# Patient Record
Sex: MALE | Race: WHITE | NOT HISPANIC OR LATINO | Employment: OTHER | URBAN - METROPOLITAN AREA
[De-identification: names, ages, dates, MRNs, and addresses within clinical notes are randomized per-mention and may not be internally consistent; named-entity substitution may affect disease eponyms.]

---

## 2018-05-15 ENCOUNTER — OFFICE VISIT (OUTPATIENT)
Dept: FAMILY MEDICINE | Facility: CLINIC | Age: 53
End: 2018-05-15
Payer: COMMERCIAL

## 2018-05-15 VITALS
OXYGEN SATURATION: 97 % | BODY MASS INDEX: 33.35 KG/M2 | RESPIRATION RATE: 16 BRPM | TEMPERATURE: 98 F | SYSTOLIC BLOOD PRESSURE: 130 MMHG | HEART RATE: 70 BPM | HEIGHT: 72 IN | DIASTOLIC BLOOD PRESSURE: 82 MMHG | WEIGHT: 246.25 LBS

## 2018-05-15 DIAGNOSIS — Z12.11 COLON CANCER SCREENING: ICD-10-CM

## 2018-05-15 DIAGNOSIS — Z23 NEED FOR DIPHTHERIA-TETANUS-PERTUSSIS (TDAP) VACCINE: ICD-10-CM

## 2018-05-15 DIAGNOSIS — Z13.220 LIPID SCREENING: ICD-10-CM

## 2018-05-15 DIAGNOSIS — M54.12 CERVICAL RADICULOPATHY: Primary | ICD-10-CM

## 2018-05-15 LAB
ALBUMIN SERPL BCP-MCNC: 4.1 G/DL
ALP SERPL-CCNC: 52 U/L
ALT SERPL W/O P-5'-P-CCNC: 26 U/L
ANION GAP SERPL CALC-SCNC: 8 MMOL/L
AST SERPL-CCNC: 16 U/L
BILIRUB SERPL-MCNC: 0.4 MG/DL
BUN SERPL-MCNC: 18 MG/DL
CALCIUM SERPL-MCNC: 9.5 MG/DL
CHLORIDE SERPL-SCNC: 107 MMOL/L
CHOLEST SERPL-MCNC: 264 MG/DL
CHOLEST/HDLC SERPL: 4.3 {RATIO}
CO2 SERPL-SCNC: 24 MMOL/L
CREAT SERPL-MCNC: 0.9 MG/DL
EST. GFR  (AFRICAN AMERICAN): >60 ML/MIN/1.73 M^2
EST. GFR  (NON AFRICAN AMERICAN): >60 ML/MIN/1.73 M^2
GLUCOSE SERPL-MCNC: 113 MG/DL
HDLC SERPL-MCNC: 61 MG/DL
HDLC SERPL: 23.1 %
LDLC SERPL CALC-MCNC: 165.2 MG/DL
NONHDLC SERPL-MCNC: 203 MG/DL
POTASSIUM SERPL-SCNC: 3.9 MMOL/L
PROT SERPL-MCNC: 7.5 G/DL
SODIUM SERPL-SCNC: 139 MMOL/L
TRIGL SERPL-MCNC: 189 MG/DL

## 2018-05-15 PROCEDURE — 80053 COMPREHEN METABOLIC PANEL: CPT

## 2018-05-15 PROCEDURE — 90715 TDAP VACCINE 7 YRS/> IM: CPT | Mod: S$GLB,,, | Performed by: INTERNAL MEDICINE

## 2018-05-15 PROCEDURE — 80061 LIPID PANEL: CPT

## 2018-05-15 PROCEDURE — 3008F BODY MASS INDEX DOCD: CPT | Mod: CPTII,S$GLB,, | Performed by: INTERNAL MEDICINE

## 2018-05-15 PROCEDURE — 90471 IMMUNIZATION ADMIN: CPT | Mod: S$GLB,,, | Performed by: INTERNAL MEDICINE

## 2018-05-15 PROCEDURE — 99203 OFFICE O/P NEW LOW 30 MIN: CPT | Mod: 25,S$GLB,, | Performed by: INTERNAL MEDICINE

## 2018-05-15 RX ORDER — TRAMADOL HYDROCHLORIDE 50 MG/1
1 TABLET ORAL EVERY 6 HOURS PRN
COMMUNITY
Start: 2018-05-14 | End: 2018-12-27 | Stop reason: ALTCHOICE

## 2018-05-15 RX ORDER — CYCLOBENZAPRINE HCL 10 MG
1 TABLET ORAL DAILY PRN
COMMUNITY
Start: 2018-05-14 | End: 2018-12-27 | Stop reason: ALTCHOICE

## 2018-05-15 NOTE — PATIENT INSTRUCTIONS
Rest.  Do not work overhead.    Smart temp cold pack    Get a back buddy Jere    Using inversion table

## 2018-05-15 NOTE — PROGRESS NOTES
Subjective:       Patient ID: Thee Christine is a 52 y.o. male.    Chief Complaint: Establish Care; Neck Pain (pinched nerve,had shots); and Hernia    HPI     CHIEF COMPLAINT: Neck Pain(+).  HPI: Has been renovating his house    ONSET/TIMING: Trauma: no. . Onset   2 weeks     ago. . Work related: no .    Similar problems  in past: no .    DURATION:      QUALITY/COURSE:    Worse.       LOCATION:   Left. Radiation: 2 view left hand.     INTENSITY/SEVERITY: Severity is # 4 (10 point scale).      SYMPTOMS/RELATED: .-Possible medication side effects include:     The following symptoms are positive if BOLD, negative otherwise.    CONTEXT/WHEN: --Activity. Coughing. Sneeze.. Working_[place].  . Repetitive_work . Hx of CA. history of IV drug abuse.. Similar_problems.     MODIFIERS/TREATMENTS: . Taking medications.  Steroid shot, tramadol, Flexeril   Chiropractor.  Litigation_pending. c-spine_x-rays. CT. MRI. Surgery.     REVIEW OF SYMPTOMS:  . Arm_Pain_to_below _elbow . .Weight_loss.          Review of Systems    Objective:      Vitals:    05/15/18 1005   BP: 130/82   Pulse: 70   Resp: 16   Temp: 98 °F (36.7 °C)   TempSrc: Oral   SpO2: 97%   Weight: 111.7 kg (246 lb 4.1 oz)   Height: 6' (1.829 m)   PainSc:   4   PainLoc: Neck     Physical Exam   Constitutional: He appears well-developed and well-nourished.   Cardiovascular: Normal rate, regular rhythm and normal heart sounds.    Pulmonary/Chest: Effort normal and breath sounds normal.   Abdominal: Soft. There is no tenderness.   Musculoskeletal:   Tender left upper trapezius, full range of motion of the left shoulder.   Neurological: He is alert.   Psychiatric: He has a normal mood and affect. His behavior is normal. Thought content normal.   Nursing note and vitals reviewed.  The 10-year ASCVD risk score (Samson GREER Jr., et al., 2013) is: 4.7%    Values used to calculate the score:      Age: 52 years      Sex: Male      Is Non- : No      Diabetic: No       Tobacco smoker: No      Systolic Blood Pressure: 130 mmHg      Is BP treated: No      HDL Cholesterol: 61 mg/dL      Total Cholesterol: 264 mg/dL        Assessment:       1. Cervical radiculopathy    2. Colon cancer screening    3. Need for diphtheria-tetanus-pertussis (Tdap) vaccine    4. Lipid screening          Plan:     Cervical radiculopathy  -     Ambulatory consult to Physical Therapy    Colon cancer screening  -     Case request GI: COLONOSCOPY    Need for diphtheria-tetanus-pertussis (Tdap) vaccine  -     (In Office Administered) Tdap Vaccine    Lipid screening  -     Comprehensive metabolic panel; Future; Expected date: 05/15/2018  -     Lipid panel; Future; Expected date: 05/15/2018      Follow-up in about 6 weeks (around 6/26/2018).

## 2018-05-18 ENCOUNTER — TELEPHONE (OUTPATIENT)
Dept: FAMILY MEDICINE | Facility: CLINIC | Age: 53
End: 2018-05-18

## 2018-05-18 NOTE — TELEPHONE ENCOUNTER
Patient called because he has not heard from PT yet and he is in pain.I gave him the number to contact PT.

## 2018-05-18 NOTE — TELEPHONE ENCOUNTER
----- Message from Renee Vasquez sent at 5/18/2018  1:07 PM CDT -----   Type: Needs Medical Advice    Who Called: pt  Symptoms (please be specific):  na  How long has patient had these symptoms:  roberth  Pharmacy name and phone #na  Best Call Back Number:[phone]  Additional Information:  calling  to  Discuss  Physical therapy

## 2018-05-29 ENCOUNTER — TELEPHONE (OUTPATIENT)
Dept: FAMILY MEDICINE | Facility: CLINIC | Age: 53
End: 2018-05-29

## 2018-05-29 DIAGNOSIS — M54.2 NECK PAIN: Primary | ICD-10-CM

## 2018-05-29 NOTE — TELEPHONE ENCOUNTER
----- Message from Brad Dominguez sent at 5/29/2018 11:22 AM CDT -----  Contact: [place] from [facility]  Name of Who is Calling: [place]      What is the request in detail: [place] is calling states she needs an order or prescription for physical therapy. Pt is being seen for neck pains.      Can the clinic reply by [facility]: no      What Number [facility] Back [facility]: [place] fax [phone] or phone [phone]

## 2018-05-31 ENCOUNTER — PATIENT MESSAGE (OUTPATIENT)
Dept: GASTROENTEROLOGY | Facility: CLINIC | Age: 53
End: 2018-05-31

## 2018-06-25 ENCOUNTER — DOCUMENTATION ONLY (OUTPATIENT)
Dept: FAMILY MEDICINE | Facility: CLINIC | Age: 53
End: 2018-06-25

## 2018-12-27 ENCOUNTER — OFFICE VISIT (OUTPATIENT)
Dept: FAMILY MEDICINE | Facility: CLINIC | Age: 53
End: 2018-12-27
Payer: COMMERCIAL

## 2018-12-27 VITALS
OXYGEN SATURATION: 96 % | SYSTOLIC BLOOD PRESSURE: 129 MMHG | DIASTOLIC BLOOD PRESSURE: 81 MMHG | HEIGHT: 72 IN | RESPIRATION RATE: 14 BRPM | WEIGHT: 239.44 LBS | BODY MASS INDEX: 32.43 KG/M2 | HEART RATE: 88 BPM | TEMPERATURE: 99 F

## 2018-12-27 DIAGNOSIS — J40 BRONCHITIS: Primary | ICD-10-CM

## 2018-12-27 PROCEDURE — 99213 OFFICE O/P EST LOW 20 MIN: CPT | Mod: S$GLB,,, | Performed by: NURSE PRACTITIONER

## 2018-12-27 RX ORDER — BENZONATATE 200 MG/1
200 CAPSULE ORAL 3 TIMES DAILY PRN
Qty: 30 CAPSULE | Refills: 0 | Status: SHIPPED | OUTPATIENT
Start: 2018-12-27 | End: 2019-02-11 | Stop reason: ALTCHOICE

## 2018-12-27 RX ORDER — PREDNISONE 5 MG/1
TABLET ORAL
Qty: 21 TABLET | Refills: 0 | Status: SHIPPED | OUTPATIENT
Start: 2018-12-27 | End: 2019-02-11 | Stop reason: ALTCHOICE

## 2018-12-27 NOTE — PATIENT INSTRUCTIONS
tylenol for aches, pain, fever (no more than 3000 mg. Daily) may use one tylenol alternating with one ibuprofen every 4 hours.      1 week if not better

## 2018-12-27 NOTE — PROGRESS NOTES
Subjective:       Patient ID: Thee Christine is a 53 y.o. male.    Chief Complaint: Sinus Problem; Cough; and Chest Congestion    Sinusitis   This is a new problem. The current episode started in the past 7 days (4 days ago). Progression since onset: sinusitis is improving, cough is worse. Maximum temperature: did not check with thermometer. Associated symptoms include chills, congestion, coughing, diaphoresis, headaches, a hoarse voice and sinus pressure. Pertinent negatives include no sore throat. Treatments tried: advil cold and sinus, apple cider vinegar with honey, theraflu. The treatment provided mild relief.     Review of Systems   Constitutional: Positive for chills and diaphoresis.   HENT: Positive for congestion, hoarse voice and sinus pressure. Negative for sore throat.    Respiratory: Positive for cough.    Neurological: Positive for headaches.       Objective:      Physical Exam   Constitutional: He is oriented to person, place, and time. He appears well-developed and well-nourished. No distress.   HENT:   Head: Normocephalic and atraumatic.   Right Ear: External ear normal.   Left Ear: External ear normal.   Mouth/Throat: No oropharyngeal exudate.   Oropharynx erythematous.    Eyes: Conjunctivae are normal. Right eye exhibits no discharge. Left eye exhibits no discharge. No scleral icterus.   Cardiovascular: Normal rate, regular rhythm and normal heart sounds. Exam reveals no gallop and no friction rub.   No murmur heard.  Pulmonary/Chest: Effort normal and breath sounds normal. No stridor. No respiratory distress. He has no wheezes. He has no rales.   Neurological: He is alert and oriented to person, place, and time.   Skin: Skin is warm and dry. He is not diaphoretic.   Psychiatric: He has a normal mood and affect. His behavior is normal.   Nursing note and vitals reviewed.      Assessment:     This office visit cannot be billed incident to as it does not meet the needed criteria.     1. Bronchitis         Plan:       Bronchitis  -     predniSONE (DELTASONE) 5 MG tablet; 6 tabs the first day, then 5 the next, then 4, 3, 2, 1  Dispense: 21 tablet; Refill: 0  -     benzonatate (TESSALON) 200 MG capsule; Take 1 capsule (200 mg total) by mouth 3 (three) times daily as needed for Cough.  Dispense: 30 capsule; Refill: 0    tylenol for aches, pain, fever (no more than 3000 mg. Daily) may use one tylenol alternating with one ibuprofen every 4 hours.      1 week if not better

## 2019-01-31 ENCOUNTER — PATIENT MESSAGE (OUTPATIENT)
Dept: GASTROENTEROLOGY | Facility: CLINIC | Age: 54
End: 2019-01-31

## 2019-02-11 ENCOUNTER — DOCUMENTATION ONLY (OUTPATIENT)
Dept: FAMILY MEDICINE | Facility: CLINIC | Age: 54
End: 2019-02-11

## 2019-02-11 ENCOUNTER — OFFICE VISIT (OUTPATIENT)
Dept: FAMILY MEDICINE | Facility: CLINIC | Age: 54
End: 2019-02-11
Payer: COMMERCIAL

## 2019-02-11 VITALS
DIASTOLIC BLOOD PRESSURE: 86 MMHG | WEIGHT: 238.13 LBS | HEART RATE: 76 BPM | RESPIRATION RATE: 16 BRPM | HEIGHT: 72 IN | SYSTOLIC BLOOD PRESSURE: 140 MMHG | TEMPERATURE: 98 F | OXYGEN SATURATION: 96 % | BODY MASS INDEX: 32.25 KG/M2

## 2019-02-11 DIAGNOSIS — R42 ORTHOSTATIC DIZZINESS: Primary | ICD-10-CM

## 2019-02-11 PROCEDURE — 99214 PR OFFICE/OUTPT VISIT, EST, LEVL IV, 30-39 MIN: ICD-10-PCS | Mod: S$GLB,,, | Performed by: INTERNAL MEDICINE

## 2019-02-11 PROCEDURE — 99214 OFFICE O/P EST MOD 30 MIN: CPT | Mod: S$GLB,,, | Performed by: INTERNAL MEDICINE

## 2019-02-11 NOTE — PROGRESS NOTES
Health Maintenance Due   Topic Date Due    Colonoscopy  06/10/2016    Influenza Vaccine  08/01/2018

## 2019-02-11 NOTE — PROGRESS NOTES
Subjective:       Patient ID: Thee Christine is a 53 y.o. male.    Chief Complaint: Hypertension and Dizziness    HPI     CHIEF COMPLAINT: dizziness .  HPI: daughter with flu.     ONSET/TIMING: Onset     2 d   days ago.         Trauma: no    DURATION:  Intermittent     QUALITY/COURSE:  unchanged    INTENSITY/SEVERITY:  severity 5   (on a 1-10 scale).        MODIFIERS/TREATMENTS:   Taking medications:   . ENT consult done: no.     SYMPTOMS/RELATED:  Possible medication side effects include:        The following symptoms/statements are positive if BOLDED, otherwise negative.          CONTEXT/WHEN:  Lying down.  Sitting up .Standing up. turning head.  Sudden.. Trauma. Similar_problems_in_past.           .     REVIEW OF SYSTEMS :   vertigo . visual aura.    CULPRIT MEDICATIONS: : alpha blockers, beta blockers, calcium channel blockers, diuretics, epileptogenic medication, hypoglycemic agents, hypotensive medications             Review of Systems   HENT: Negative for hearing loss and tinnitus.    Gastrointestinal: Negative for diarrhea, nausea and vomiting.   Neurological: Positive for dizziness and light-headedness. Negative for numbness and headaches.         Objective:      Vitals:    02/11/19 1413   BP: (!) 140/86   Pulse: 76   Resp: 16   Temp: 97.9 °F (36.6 °C)   TempSrc: Oral   SpO2: 96%   Weight: 108 kg (238 lb 1.6 oz)   Height: 6' (1.829 m)   PainSc: 0-No pain    Blood pressure lying 136/82 pulse 66  Blood pressure standing 120/70 pulse 92  Physical Exam   Constitutional: He appears well-developed and well-nourished.   HENT:   Right Ear: External ear normal.   Left Ear: External ear normal.   Mouth/Throat: Oropharynx is clear and moist. No oropharyngeal exudate.   Extraocular motions normal.  No nystagmus.  Hallpike negative.   Eyes: Pupils are equal, round, and reactive to light.   Cardiovascular: Normal rate, regular rhythm and normal heart sounds. Exam reveals no gallop.   No murmur heard.  Pulmonary/Chest:  Effort normal and breath sounds normal. He has no wheezes. He has no rales. He exhibits no tenderness.   Abdominal: Soft. There is no tenderness.   Musculoskeletal: He exhibits no edema.   Lymphadenopathy:     He has no cervical adenopathy.   Neurological: He is alert.   Psychiatric: He has a normal mood and affect. His behavior is normal. Thought content normal.   Nursing note and vitals reviewed.   Normal gait      Assessment:       1. Orthostatic dizziness          Plan:       Orthostatic dizziness  -     CBC auto differential; Future; Expected date: 02/11/2019  -     Comprehensive metabolic panel; Future; Expected date: 02/11/2019      Follow-up for if you are not better return in one week.

## 2019-06-17 ENCOUNTER — TELEPHONE (OUTPATIENT)
Dept: GASTROENTEROLOGY | Facility: CLINIC | Age: 54
End: 2019-06-17

## 2020-05-26 ENCOUNTER — TELEPHONE (OUTPATIENT)
Dept: ENDOSCOPY | Facility: HOSPITAL | Age: 55
End: 2020-05-26

## 2020-05-26 NOTE — TELEPHONE ENCOUNTER
Called to schedule colonoscopy. States doesn't have insurance. Also now living in [facility] so would probably do the procedure over there. Instructed to call PCP when ready to schedule

## 2020-10-05 ENCOUNTER — PATIENT MESSAGE (OUTPATIENT)
Dept: ADMINISTRATIVE | Facility: HOSPITAL | Age: 55
End: 2020-10-05

## 2020-10-30 ENCOUNTER — PATIENT MESSAGE (OUTPATIENT)
Dept: ADMINISTRATIVE | Facility: HOSPITAL | Age: 55
End: 2020-10-30

## 2021-01-04 ENCOUNTER — PATIENT MESSAGE (OUTPATIENT)
Dept: ADMINISTRATIVE | Facility: HOSPITAL | Age: 56
End: 2021-01-04

## 2021-04-05 ENCOUNTER — PATIENT MESSAGE (OUTPATIENT)
Dept: ADMINISTRATIVE | Facility: HOSPITAL | Age: 56
End: 2021-04-05

## 2021-04-26 ENCOUNTER — PATIENT MESSAGE (OUTPATIENT)
Dept: RESEARCH | Facility: HOSPITAL | Age: 56
End: 2021-04-26

## 2021-07-06 ENCOUNTER — PATIENT MESSAGE (OUTPATIENT)
Dept: ADMINISTRATIVE | Facility: HOSPITAL | Age: 56
End: 2021-07-06

## 2021-10-07 ENCOUNTER — PATIENT MESSAGE (OUTPATIENT)
Dept: ADMINISTRATIVE | Facility: HOSPITAL | Age: 56
End: 2021-10-07

## 2024-08-31 ENCOUNTER — OFFICE VISIT (OUTPATIENT)
Dept: URGENT CARE | Facility: CLINIC | Age: 59
End: 2024-08-31

## 2024-08-31 VITALS
DIASTOLIC BLOOD PRESSURE: 79 MMHG | RESPIRATION RATE: 18 BRPM | BODY MASS INDEX: 32.8 KG/M2 | HEIGHT: 72 IN | SYSTOLIC BLOOD PRESSURE: 124 MMHG | OXYGEN SATURATION: 98 % | WEIGHT: 242.19 LBS | TEMPERATURE: 98 F | HEART RATE: 61 BPM

## 2024-08-31 DIAGNOSIS — H10.13 ALLERGIC CONJUNCTIVITIS OF BOTH EYES: ICD-10-CM

## 2024-08-31 DIAGNOSIS — H10.33 ACUTE BACTERIAL CONJUNCTIVITIS OF BOTH EYES: Primary | ICD-10-CM

## 2024-08-31 PROCEDURE — 99214 OFFICE O/P EST MOD 30 MIN: CPT | Mod: TIER,S$GLB,,

## 2024-08-31 RX ORDER — OFLOXACIN 3 MG/ML
SOLUTION/ DROPS OPHTHALMIC
Qty: 10 ML | Refills: 0 | Status: SHIPPED | OUTPATIENT
Start: 2024-08-31

## 2024-08-31 RX ORDER — OLOPATADINE HYDROCHLORIDE 1 MG/ML
1 SOLUTION/ DROPS OPHTHALMIC 2 TIMES DAILY
Qty: 5 ML | Refills: 0 | Status: SHIPPED | OUTPATIENT
Start: 2024-08-31

## 2024-08-31 NOTE — PROGRESS NOTES
Subjective:      Patient ID: Thee Christine is a 59 y.o. male.    Vitals:  height is 6' (1.829 m) and weight is 109.8 kg (242 lb 2.8 oz). His oral temperature is 98.2 °F (36.8 °C). His blood pressure is 124/79 and his pulse is 61. His respiration is 18 and oxygen saturation is 98%.     Chief Complaint: Eye Pain    Pt states his eyes are red, burning, painful and watery since last night.  Pt states the street lights appeared to have a rainbow around them. Pt reports helping friends move yesterday and he may have gotten something in his eyes.    Eye Pain   Both eyes are affected. This is a new problem. The current episode started yesterday. The problem has been gradually worsening. The injury mechanism is unknown. The pain is at a severity of 6/10. The pain is moderate. There is No known exposure to pink eye. He Does not wear contacts. Associated symptoms include blurred vision, an eye discharge, eye redness, itching and photophobia. Pertinent negatives include no double vision or fever. He has tried water and eye drops (Neosporin) for the symptoms. The treatment provided no relief.       Constitution: Negative for chills, fatigue and fever.   HENT:  Negative for congestion and postnasal drip.    Eyes:  Positive for eye discharge, eye itching, eye pain, eye redness, photophobia and blurred vision. Negative for eye trauma, foreign body in eye, vision loss, double vision and eyelid swelling.   Respiratory:  Negative for cough.       Objective:     Physical Exam   Constitutional: He is oriented to person, place, and time. He appears well-developed.   HENT:   Head: Normocephalic and atraumatic.   Ears:   Right Ear: External ear normal.   Left Ear: External ear normal.   Nose: Nose normal.   Mouth/Throat: Oropharynx is clear and moist.   Eyes: EOM and lids are normal. Pupils are equal, round, and reactive to light. Right eye exhibits discharge. Left eye exhibits discharge. Right conjunctiva is injected. Left conjunctiva is  injected.   Neck: Trachea normal and phonation normal. Neck supple.   Musculoskeletal: Normal range of motion.         General: Normal range of motion.   Neurological: He is alert and oriented to person, place, and time.   Skin: Skin is warm, dry and intact.   Psychiatric: His speech is normal and behavior is normal. Judgment and thought content normal.   Nursing note and vitals reviewed.      Assessment:     1. Acute bacterial conjunctivitis of both eyes    2. Allergic conjunctivitis of both eyes      Vision Screening    Right eye Left eye Both eyes   Without correction 20/20 20/18 20/13   With correction          Plan:       Acute bacterial conjunctivitis of both eyes  -     ofloxacin (OCUFLOX) 0.3 % ophthalmic solution; Instill 1 to 2 drops in affected eye(s) every 2 to 4 hours for the first 2 days, then instill 1 to 2 drops 4 times daily for an additional 5 days.  Dispense: 10 mL; Refill: 0  -     Ambulatory referral/consult to Ophthalmology    Allergic conjunctivitis of both eyes  -     olopatadine (PATANOL) 0.1 % ophthalmic solution; Place 1 drop into both eyes 2 (two) times daily.  Dispense: 5 mL; Refill: 0            Discussed results/diagnosis/plan with patient in clinic. Strict precautions given to patient to monitor for worsening signs and symptoms. Advised to follow up with PCP or specialist.  Explained side effects of medications prescribed with patient and informed him/her to discontinue use if he/she has any side effects and to inform UC or PCP if this occurs. All questions answered. Strict ED verses clinic return precautions stressed and given in depth. Advised if symptoms worsens of fail to improve he/she should go to the Emergency Room. Discharge and follow-up instructions given verbally/printed with the patient who expressed understanding and willingness to comply with my recommendations. Patient voiced understanding and in agreement with current treatment plan. Patient exits the exam room in no  acute distress. Conversant and engaged during discharge discussion, verbalized understanding.

## 2024-08-31 NOTE — PATIENT INSTRUCTIONS
Apply antibiotic eye drops (ofloxacin) as directed x 7 days.  I have included instructions in your discharge paperwork on the proper way to instill eyedrops into your eyes.    Try the olopatadine eye drops for allergic symptoms as needed (itchy, watery eyes).     Apply cool compresses to your eyes and should be applied several times a day.  Clean your eyes with a warm, moist cloth from inner outer to prevent spreading infection.  Have good hand washing techniques with antibacterial soap.   Do not wear make up at this time and discard all make up including mascara, eye liner, and eye shadow worn at the time of the infection.  Please keep in mind that you are contagious until 24 hours after beginning your antibiotic.      If your symptoms do not get better within the next 5-7 days, you will need to follow-up with an eye doctor. A referral was placed for you, call [phone] to schedule appointment.     Please go to the ER if you develop eye pain, facial pain, facial swelling, high fevers, change in your vision.